# Patient Record
Sex: MALE | Race: WHITE
[De-identification: names, ages, dates, MRNs, and addresses within clinical notes are randomized per-mention and may not be internally consistent; named-entity substitution may affect disease eponyms.]

---

## 2018-09-22 NOTE — UC
Eye Complaint HPI





- HPI Summary


HPI Summary: 





feelos like he scratched his left eye---pain and clear drainage, some redness





- History of Current Complaint


Chief Complaint: UCEye


Stated Complaint: EYE COMPLAINT


Time Seen by Provider: 09/22/18 14:34


Hx Obtained From: Patient


Onset/Duration: Sudden Onset, Lasting Days - 1, Still Present


Timing: Constant


Severity Initially: Moderate


Severity Currently: Moderate


Location of Injury: Conjunctiva


Character: Foreign Body Sensation


Aggravating Factor(s): Nothing


Alleviating Factor(s): Nothing


Associated Signs And Symptoms: Positive: Drainage (Clear)


Related History: Similar Episode





- Allergies/Home Medications


Allergies/Adverse Reactions: 


 Allergies











Allergy/AdvReac Type Severity Reaction Status Date / Time


 


No Known Allergies Allergy   Verified 09/22/18 14:01














PMH/Surg Hx/FS Hx/Imm Hx


Previously Healthy: Yes





- Surgical History


Surgical History: Yes


Surgery Procedure, Year, and Place: Appendectomy





- Family History


Known Family History: Positive: None





- Social History


Occupation: Employed Full-time


Lives: With Family


Alcohol Use: Weekly


Substance Use Type: None


Smoking Status (MU): Current Some Day Smoker


Type: Cigarettes


Amount Used/How Often: occassionally


Have You Smoked in the Last Year: Yes





Review of Systems


Constitutional: Negative


Skin: Negative


Eyes: Eye Redness - left


ENT: Negative


Respiratory: Negative


Cardiovascular: Negative


Gastrointestinal: Negative


Genitourinary: Negative


Motor: Negative


Neurovascular: Negative


Musculoskeletal: Negative


Neurological: Negative


Psychological: Negative


Is Patient Immunocompromised?: No


All Other Systems Reviewed And Are Negative: Yes





Physical Exam


Triage Information Reviewed: Yes


Appearance: Well-Appearing, No Pain Distress, Well-Nourished


Vital Signs: 


 Initial Vital Signs











Temp  98.1 F   09/22/18 13:55


 


Pulse  78   09/22/18 13:55


 


Resp  18   09/22/18 13:55


 


BP  127/71   09/22/18 13:55


 


Pulse Ox  99   09/22/18 13:55











Vital Signs Reviewed: Yes


Eye Exam: Normal


Eyes: Positive: Conjunctiva Clear - right, Conjunctiva Inflamed - left, 

Discharge - clear from left eye


ENT Exam: Normal


ENT: Positive: Normal ENT inspection, Hearing grossly normal.  Negative: Trismus

, Muffled voice, Hoarse voice, Dental tenderness


Dental Exam: Normal


Neck exam: Normal


Neck: Positive: Supple, Nontender


Respiratory Exam: Normal


Respiratory: Positive: Chest non-tender, No respiratory distress, No accessory 

muscle use


Cardiovascular Exam: Normal


Cardiovascular: Positive: RRR, Pulses Normal, Brisk Capillary Refill


Musculoskeletal Exam: Normal


Musculoskeletal: Positive: Strength Intact, ROM Intact, No Edema


Neurological Exam: Normal


Neurological: Positive: Alert, Muscle Tone Normal


Psychological Exam: Normal


Skin Exam: Normal





Re-Evaluation





- Re-Evaluation


  ** First Eval


Change: Improved - complete pain relief with tetracaine, puipl ERRLA, eye 

stained and evidence of corneal abrasion, no fb noted





Eye Complaint Course/Dx





- Course


Course Of Treatment: polytrim drops follow with opthomology if fails to resove 

in 24 hours to ED should symptoms worsen





- Differential Dx/Diagnosis


Provider Diagnoses: conjuctivitis OS





Discharge





- Sign-Out/Discharge


Documenting (check all that apply): Patient Departure


All imaging exams completed and their final reports reviewed: No Studies





- Discharge Plan


Condition: Stable


Disposition: HOME


Prescriptions: 


Polymyx/Trimethoprim OPTH* [Polytrim OPHTH*] 1 drop LEFT EYE Q4H #1 btl


Patient Education Materials:  Conjunctivitis (ED)


Referrals: 


Leonid Butts MD [Primary Care Provider] - 


Malcolm Galo MD [Medical Doctor] - If Needed





- Billing Disposition and Condition


Condition: STABLE


Disposition: Home

## 2020-03-22 ENCOUNTER — HOSPITAL ENCOUNTER (EMERGENCY)
Dept: HOSPITAL 25 - UCEAST | Age: 35
Discharge: HOME | End: 2020-03-22
Payer: COMMERCIAL

## 2020-03-22 VITALS — SYSTOLIC BLOOD PRESSURE: 140 MMHG | DIASTOLIC BLOOD PRESSURE: 68 MMHG

## 2020-03-22 DIAGNOSIS — Z23: ICD-10-CM

## 2020-03-22 DIAGNOSIS — S51.831A: Primary | ICD-10-CM

## 2020-03-22 DIAGNOSIS — Z72.0: ICD-10-CM
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
## 2020-03-22 DIAGNOSIS — E11.9: ICD-10-CM

## 2020-03-22 DIAGNOSIS — R56.9: ICD-10-CM

## 2020-03-22 PROCEDURE — G0463 HOSPITAL OUTPT CLINIC VISIT: HCPCS

## 2020-03-22 PROCEDURE — 90715 TDAP VACCINE 7 YRS/> IM: CPT

## 2020-03-22 PROCEDURE — 90471 IMMUNIZATION ADMIN: CPT

## 2020-03-22 PROCEDURE — 12001 RPR S/N/AX/GEN/TRNK 2.5CM/<: CPT

## 2020-03-22 PROCEDURE — 99212 OFFICE O/P EST SF 10 MIN: CPT

## 2020-03-22 NOTE — UC
Bite Injury/Animal HPI





- HPI Summary


HPI Summary: 


Patient  is 34 year old male , who  presents today  to the urgent care with  

dog bite today. 


This is  his   own dog  who bit him  on his right forearm while he was having a 

seizure. 


The dog is at the vet. the pt is here with a right arm wound.


Dog is  up to date with his shots.


Not sure of a tetanus shot , last  was many years ago; 








- History of Current Complaint


Stated Complaint: DOG BITE


Time Seen by Provider: 03/22/20 12:58


Hx Obtained From: Patient





- Allergies/Home Medications


Allergies/Adverse Reactions: 


 Allergies











Allergy/AdvReac Type Severity Reaction Status Date / Time


 


No Known Allergies Allergy   Verified 12/19/19 10:24











Home Medications: 


 Home Medications





Amoxicillin/Clavulanate TAB* [Augmentin *] 875 mg PO BID 10 Days #20 tab 

03/22/20 [Rx]


Insulin Aspart Prot/Insuln Asp [Insulin Aspart Prot-Insuln Asp 10 ml VIAL]  03/ 22/20 [History]


Insulin GLARGINE(*) [Lantus 100 units/ml 10 ml VIAL (*)] 10 unit 03/22/20 [

History]











PMH/Surg Hx/FS Hx/Imm Hx





- Additional Past Medical History


Additional PMH: 


Past Medical History : Diabetes mellitus, well controlled with exercise and diet


Past Surgical History: Appendectomy, left gynecomastia injury


Family History : MS in father 


Social History : Weekly alcohol, former smoker, marijuana use.  





Previously Healthy: Yes





- Surgical History


Surgical History: Yes


Surgery Procedure, Year, and Place: 5/5/2005 Appendectomy.  5/17/2006 LEFT 

GYNECOMASTIA (LIPECTOMY) - [facility]





- Family History


Known Family History: Positive: None





- Social History


Alcohol Use: Weekly


Substance Use Type: None


Smoking Status (MU): Current Some Day Smoker


Type: Cigarettes


Amount Used/How Often: occassionally


Have You Smoked in the Last Year: Yes





Review of Systems


All Other Systems Reviewed And Are Negative: Yes


Constitutional: Positive: Negative


Skin: Positive: Other - puncture wound


Eyes: Positive: Negative


ENT: Positive: Negative


Respiratory: Positive: Negative


Cardiovascular: Positive: Negative


Gastrointestinal: Positive: Negative


Genitourinary: Positive: Negative


Motor: Positive: Negative


Neurovascular: Positive: Negative


Musculoskeletal: Positive: Negative


Neurological/Mental Status: Positive: Negative


Psychological: Positive: Negative


Is Patient Immunocompromised?: No





Physical Exam





- Summary


Physical Exam Summary: 


Vital Signs Reviewed: Yes


A+Ox3, no distress


Eyes: Conjunctiva Clear


ENT: Hearing grossly normal


neck: supple


Respiratory: Positive: No respiratory distress, No accessory muscle use


Cardiovascular: skin color reflect adequate perfusion Musculoskeletal Exam: MCQUEEN 

x 4 without difficulty


Neurological: Positive: Alert,  ambulatory without difficulty


Psychological: Positive: Normal Response To Family


Skin: Positive: right forearm: dorsal aspect  with puncture wound - 5 mm in 

length and 3 mm in depth . Small swelling but no skin break down on the volar 

aspect . 





Triage Information Reviewed: Yes


Vital Signs Reviewed: Yes





Procedures





- Procedure Summary


Procedure Summary: 


Procedure note:


Right dorsal forearm puncture wound: Wound initially irrigated with normal 

saline. After cleaning the  wound with  With Betadine and alcohol, right 

forearm puncture wound was closed with good approximation using dermabond 

tissue glue and steristrips applied.











Bite Injury Course/Dx





- Course


Course Of Treatment: 


During the visit today, right forearm puncture wound was closed with good 

approximation using Dermabond tissue glue and Steri-Strips were applied.


Tetanus updated. I will prescribe the medication to the pharmacy . 


Follow up with PMD if needed in 2-3 days.


Patient expressed understanding . 








- Differential Dx/Diagnosis


Provider Diagnosis: 


 Dog bite








Discharge ED





- Sign-Out/Discharge


Documenting (check all that apply): Patient Departure


All imaging exams completed and their final reports reviewed: No Studies





- Discharge Plan


Condition: Stable


Disposition: HOME


Prescriptions: 


Amoxicillin/Clavulanate TAB* [Augmentin *] 875 mg PO BID 10 Days #20 tab


Patient Education Materials:  Animal Bite (ED)


Referrals: 


Gelacio Velasco MD [Primary Care Provider] - If Needed


Additional Instructions: 


Please start taking the medication as prescribed to the pharmacy . 


Monitor for any signs of infection as discussed


Follow up with your primary care doctor in 2 to 3 days if needed


Patients blood pressure slightly high in Urgent care today  , plan follow up 

with PCP for better control 


Return to Urgent care / ER if symptoms get worse. 





- Billing Disposition and Condition


Condition: STABLE


Disposition: Home